# Patient Record
Sex: MALE | Race: ASIAN | NOT HISPANIC OR LATINO | URBAN - METROPOLITAN AREA
[De-identification: names, ages, dates, MRNs, and addresses within clinical notes are randomized per-mention and may not be internally consistent; named-entity substitution may affect disease eponyms.]

---

## 2024-01-31 ENCOUNTER — EMERGENCY (EMERGENCY)
Facility: HOSPITAL | Age: 24
LOS: 1 days | Discharge: HOME | End: 2024-01-31
Attending: EMERGENCY MEDICINE | Admitting: EMERGENCY MEDICINE
Payer: COMMERCIAL

## 2024-01-31 VITALS
RESPIRATION RATE: 16 BRPM | TEMPERATURE: 103 F | OXYGEN SATURATION: 97 % | SYSTOLIC BLOOD PRESSURE: 115 MMHG | HEART RATE: 120 BPM | DIASTOLIC BLOOD PRESSURE: 73 MMHG

## 2024-01-31 VITALS
RESPIRATION RATE: 16 BRPM | OXYGEN SATURATION: 98 % | HEART RATE: 104 BPM | DIASTOLIC BLOOD PRESSURE: 76 MMHG | TEMPERATURE: 100 F | SYSTOLIC BLOOD PRESSURE: 112 MMHG

## 2024-01-31 DIAGNOSIS — Z20.822 CONTACT WITH AND (SUSPECTED) EXPOSURE TO COVID-19: ICD-10-CM

## 2024-01-31 DIAGNOSIS — J10.1 INFLUENZA DUE TO OTHER IDENTIFIED INFLUENZA VIRUS WITH OTHER RESPIRATORY MANIFESTATIONS: ICD-10-CM

## 2024-01-31 DIAGNOSIS — R05.9 COUGH, UNSPECIFIED: ICD-10-CM

## 2024-01-31 LAB
FLUAV AG NPH QL: DETECTED
FLUBV AG NPH QL: SIGNIFICANT CHANGE UP
RSV RNA NPH QL NAA+NON-PROBE: SIGNIFICANT CHANGE UP
S PYO AG SPEC QL IA: NEGATIVE — SIGNIFICANT CHANGE UP
SARS-COV-2 RNA SPEC QL NAA+PROBE: SIGNIFICANT CHANGE UP

## 2024-01-31 PROCEDURE — 99284 EMERGENCY DEPT VISIT MOD MDM: CPT

## 2024-01-31 RX ORDER — IBUPROFEN 200 MG
1 TABLET ORAL
Qty: 21 | Refills: 0
Start: 2024-01-31 | End: 2024-02-06

## 2024-01-31 RX ORDER — ACETAMINOPHEN 500 MG
975 TABLET ORAL ONCE
Refills: 0 | Status: COMPLETED | OUTPATIENT
Start: 2024-01-31 | End: 2024-01-31

## 2024-01-31 RX ORDER — IBUPROFEN 200 MG
800 TABLET ORAL ONCE
Refills: 0 | Status: COMPLETED | OUTPATIENT
Start: 2024-01-31 | End: 2024-01-31

## 2024-01-31 RX ADMIN — Medication 975 MILLIGRAM(S): at 17:37

## 2024-01-31 RX ADMIN — Medication 800 MILLIGRAM(S): at 17:08

## 2024-01-31 NOTE — ED ADULT NURSE NOTE - OBJECTIVE STATEMENT
Pt reports fever, general malaise, cough, sore throat that started monday and has gotten worse last night. Pt alert and oriented x3, ambulatory, respirations even and unlabored.

## 2024-01-31 NOTE — ED PROVIDER NOTE - NSFOLLOWUPINSTRUCTIONS_ED_ALL_ED_FT
Influenza, Adult    AT HOME TREATMENT  1. FLUIDS FLUIDS FLUIDS!!!!      Have 2-4 LITERS of Pedialyte, Gatorade, Coconut water,  Joes Electrolye water or Add Electrolyte Packets (Airborne for example ) to 2-4 Liters of  water for 3-4 days!!     Without FLUIDS you will Feel WEAK, TIRED, FATIGUED, HAVE BODY ACHES and YOUR FEVER WILL NOT BREAK!! DONT SKIP THE FLUIDS    2. IBUPROFEN 800 mg 3 times a day or NAPROXEN (ALEEVE) 440 mg twice a day   3. ZICAM ZINC tablets over the counter   3. You can Add TYLENOL 650 THREE TIMES A DAY IF your fever isnt breaking with #1 and #2      Influenza is also called "the flu." It is an infection in the lungs, nose, and throat (respiratory tract). It is caused by a virus. The flu causes symptoms that are similar to symptoms of a cold. It also causes a high fever and body aches.    The flu spreads easily from person to person (is contagious). Getting a flu shot (influenza vaccination) every year is the best way to prevent the flu.    What are the causes?  This condition is caused by the influenza virus. You can get the virus by:  Breathing in droplets that are in the air from the cough or sneeze of a person who has the virus.Touching something that has the virus on it (is contaminated) and then touching your mouth, nose, or eyes.    What increases the risk?  Certain things may make you more likely to get the flu. These include:  Not washing your hands often.Having close contact with many people during cold and flu season. Touching your mouth, eyes, or nose without first washing your hands.Not getting a flu shot every year.    You may have a higher risk for the flu, along with serious problems such as a lung infection (pneumonia), if you:  Are older than 65. Are pregnant. Have a weakened disease-fighting system (immune system) because of a disease or taking certain medicines.Have a long-term (chronic) illness, such as:  Heart, kidney, or lung disease.Diabetes.Asthma.Have a liver disorder. Are very overweight (morbidly obese).Have anemia. This is a condition that affects your red blood cells.     What are the signs or symptoms?    Symptoms usually begin suddenly and last 4–14 days.   They may include:  Fever and chills. Headaches, body aches, or muscle aches. Sore throat. Cough. Runny or stuffy (congested) nose. Chest discomfort. Not wanting to eat as much as normal (poor appetite).Weakness or feeling tired (fatigue).Dizziness.Feeling sick to your stomach (nauseous) or throwing up (vomiting).    How is this treated?  If the flu is found early, you can be treated with medicine that can help reduce how bad the illness is and how long it lasts (antiviral medicine). This may be given by mouth (orally) or through an IV tube.  Taking care of yourself at home can help your symptoms get better. Your doctor may suggest:  Taking over-the-counter medicines. Drinking plenty of fluids.The flu often goes away on its own. If you have very bad symptoms or other problems, you may be treated in a hospital.      Follow these instructions at home:  Activity     Rest as needed. Get plenty of sleep.Stay home from work or school as told by your doctor.   Do not leave home until you do not have a fever for 24 hours without taking medicine. Leave home only to visit your doctor.    Eating and drinking     Take an ORS (oral rehydration solution). This is a drink that is sold at pharmacies and stores.Drink enough fluid to keep your pee (urine) pale yellow.Drink clear fluids in small amounts as you are able. Clear fluids include:  Water.Ice chips.Fruit juice that has water added (diluted fruit juice).Low-calorie sports drinks.Eat bland, easy-to-digest foods in small amounts as you are able. These foods include:  Bananas.Applesauce.Rice.Lean meats.Toast.Crackers.Do not eat or drink:  Fluids that have a lot of sugar or caffeine.Alcohol.Spicy or fatty foods.    General instructions     Take over-the-counter and prescription medicines only as told by your doctor.Use a cool mist humidifier to add moisture to the air in your home. This can make it easier for you to breathe.Cover your mouth and nose when you cough or sneeze.Wash your hands with soap and water often, especially after you cough or sneeze. If you cannot use soap and water, use alcohol-based hand .Keep all follow-up visits as told by your doctor. This is important.    How is this prevented?     Get a flu shot every year. You may get the flu shot in late summer, fall, or winter. Ask your doctor when you should get your flu shot.Avoid contact with people who are sick during fall and winter (cold and flu season).    Contact a doctor if:  You get new symptoms.You have:  Chest pain.Watery poop (diarrhea).A fever.Your cough gets worse.You start to have more mucus.You feel sick to your stomach.You throw up.    Get help right away if you:  Have shortness of breath.Have trouble breathing.Have skin or nails that turn a bluish color.Have very bad pain or stiffness in your neck.Get a sudden headache.Get sudden pain in your face or ear.Cannot eat or drink without throwing up.    Summary  Influenza ("the flu") is an infection in the lungs, nose, and throat. It is caused by a virus.Take over-the-counter and prescription medicines only as told by your doctor.Getting a flu shot every year is the best way to avoid getting the flu.

## 2024-01-31 NOTE — ED PROVIDER NOTE - CLINICAL SUMMARY MEDICAL DECISION MAKING FREE TEXT BOX
Influenza A positive will start Tamiflu, supportive care, stable for [place] home with outpatient follow-up.

## 2024-01-31 NOTE — ED ADULT TRIAGE NOTE - CHIEF COMPLAINT QUOTE
Pt states dry cough, body aches, sore throat and chills x 2 days. Pt states last tylenol about 1 hr prior to arrival. Denies any medical hx

## 2024-01-31 NOTE — ED PROVIDER NOTE - PATIENT PORTAL LINK FT
You can access the FollowMyHealth Patient Portal offered by [facility] by registering at the following website: [email or web address]. By joining [facility]’s FollowMyHealth portal, you will also be able to view your health information using other applications (apps) compatible with our system.

## 2024-01-31 NOTE — ED ADULT NURSE NOTE - NSFALLUNIVINTERV_ED_ALL_ED
Bed/Stretcher in lowest position, wheels locked, appropriate side rails in place/Call bell, personal items and telephone in reach/Instruct patient to call for assistance before getting out of bed/chair/stretcher/Non-slip footwear applied when patient is off stretcher/[place] to call system/Physically safe environment - no spills, clutter or unnecessary equipment/Purposeful proactive rounding/Room/bathroom lighting operational, light cord in reach

## 2024-01-31 NOTE — ED PROVIDER NOTE - CARE PROVIDER_API CALL
[facility]  [address], [facility] [place] [address]  Phone: [phone]  Fax: [phone]  Follow Up Time: 7-10 Days

## 2024-01-31 NOTE — ED PROVIDER NOTE - OBJECTIVE STATEMENT
23-year-old male with complaint of congestion, cough, sore throat, body aches ongoing for the last 1 to 2 days, associated with fever/chills, generalized malaise.  Denies shortness of breath, no nausea vomiting or diarrhea.  No recent travel, no sick contacts.

## 2024-01-31 NOTE — ED PROVIDER NOTE - PHYSICAL EXAMINATION
VSS in NAD non toxic appearing   NCAT EOMI PERRL OP with mild erythema   heart RRR no murmur   lungs CTA no wheezing no rales no rhonchi